# Patient Record
Sex: MALE | Race: WHITE | NOT HISPANIC OR LATINO | URBAN - METROPOLITAN AREA
[De-identification: names, ages, dates, MRNs, and addresses within clinical notes are randomized per-mention and may not be internally consistent; named-entity substitution may affect disease eponyms.]

---

## 2021-07-03 ENCOUNTER — EMERGENCY (EMERGENCY)
Facility: HOSPITAL | Age: 46
LOS: 1 days | Discharge: HOME | End: 2021-07-03
Attending: EMERGENCY MEDICINE | Admitting: EMERGENCY MEDICINE
Payer: COMMERCIAL

## 2021-07-03 VITALS
SYSTOLIC BLOOD PRESSURE: 134 MMHG | HEART RATE: 78 BPM | DIASTOLIC BLOOD PRESSURE: 83 MMHG | TEMPERATURE: 98 F | OXYGEN SATURATION: 98 % | HEIGHT: 74 IN | RESPIRATION RATE: 16 BRPM | WEIGHT: 244.05 LBS

## 2021-07-03 VITALS
DIASTOLIC BLOOD PRESSURE: 74 MMHG | OXYGEN SATURATION: 100 % | RESPIRATION RATE: 15 BRPM | TEMPERATURE: 98 F | SYSTOLIC BLOOD PRESSURE: 112 MMHG | HEART RATE: 64 BPM

## 2021-07-03 PROCEDURE — 70450 CT HEAD/BRAIN W/O DYE: CPT

## 2021-07-03 PROCEDURE — 99284 EMERGENCY DEPT VISIT MOD MDM: CPT

## 2021-07-03 PROCEDURE — 99284 EMERGENCY DEPT VISIT MOD MDM: CPT | Mod: 25

## 2021-07-03 PROCEDURE — G1004: CPT

## 2021-07-03 PROCEDURE — 70450 CT HEAD/BRAIN W/O DYE: CPT | Mod: 26,MG

## 2021-07-03 RX ORDER — ACETAMINOPHEN 500 MG
650 TABLET ORAL ONCE
Refills: 0 | Status: COMPLETED | OUTPATIENT
Start: 2021-07-03 | End: 2021-07-03

## 2021-07-03 RX ORDER — MUPIROCIN 20 MG/G
1 OINTMENT TOPICAL
Qty: 1 | Refills: 0
Start: 2021-07-03 | End: 2021-07-07

## 2021-07-03 RX ADMIN — Medication 650 MILLIGRAM(S): at 18:57

## 2021-07-03 RX ADMIN — Medication 650 MILLIGRAM(S): at 17:57

## 2021-07-03 NOTE — ED PROVIDER NOTE - ATTENDING CONTRIBUTION TO CARE
46yo male who has no sig pmhx was out at a restaurant yest when an object fell and struck him in the head he sustained 2 small lacerations, but continued to enjoy his meal. this am he awoke he had had and tonight while driving he felt unwell with head pain and malaise   no n v d no weakness numbness no drainage or bleeding from wounds  on eval  muscular male awake alert nad  heent pt has two small lacerations all ready  scabbing and closing there is tenderness about the cuts to the scalp  neck is supple no meningismus  cor rrr  chest cta  abd soft nt nd no hsm ext normal  neuro normal  skin as above  plan ct ro fx ffb hematoma or other path extra or intra-calveral after fall with head injry and concussive sx.

## 2021-07-03 NOTE — ED PROVIDER NOTE - CLINICAL SUMMARY MEDICAL DECISION MAKING FREE TEXT BOX
presents today due to head injury yesterday. pt reports he was outside a restaurant in which he hit his head into a possible light fixture or key box. Pt reports he was feeling well but woke up this morning with headache and nausea. pt reports puncture wound to scalp, reports he is uncertain of tetanus status but does not want to be updated. pt reports feeling dizzy. plan includes Ct head r/o CVA, re-assess

## 2021-07-03 NOTE — ED ADULT NURSE NOTE - CHPI ED NUR SYMPTOMS NEG
no blurred vision/no change in level of consciousness/no confusion/no loss of consciousness/no seizure/no syncope/no vomiting/no weakness

## 2021-07-03 NOTE — ED PROVIDER NOTE - PHYSICAL EXAMINATION
Constitutional: Awake, Alert, non-toxic. NAD. Well appearing, well nourished.   HEAD: Normocephalic, (+) puncture wound to parietal scalp   EYES: PERRL, EOM intact, conjunctiva and sclera are clear bilaterally. No raccoon eyes.   ENT: No graham sign. No rhinorrhea, normal pharynx, patent, no tonsillar exudate or enlargement, mucous membranes pink/moist, no erythema, no drooling or stridor.   NECK: Supple, non-tender  BACK: No midline or paraspinal TTP of cervical/thoracic/lumbar spine, FROM. No ecchymosis or hematomas.   CARDIOVASCULAR: Normal S1, S2; regular rate and rhythm.  RESPIRATORY: Normal respiratory effort; breath sounds CTAB, no wheezes, rhonchi, or rales. Speaking in full sentences. No accessory muscle use.   ABDOMEN: Soft; non-tender, non-distended.   EXTREMITIES: Full passive and active ROM in all extremities; non-tender to palpation; distal pulses palpable and symmetric, no edema, no crepitus or step off  SKIN: Warm, dry; good skin turgor, no apparent lesions or rashes, no ecchymosis, brisk capillary refill.  NEURO: A&O x3. Sensory and motor functions are grossly intact. Speech is normal. Appearance and judgement seem appropriate for gender and age. No neurological deficits. Neurovascular sensation intact motor function 5/5 of upper and lower extremities, CN II-XII grossly intact, no ataxia, absent pronator drift, intact cerebellar function. Speech clear, without articulation or word-finding difficulties. Eyes- PERRL bilaterally. EOMs in tact. No nystagmus. No facial droop.

## 2021-07-03 NOTE — ED ADULT NURSE NOTE - OBJECTIVE STATEMENT
45 YOM A&OX3 with no pertinent pmh presents to ED for head pain s/p head injury yesterday. pt states was out at a restaurant yesterday when his head hit a light fixture. pt did not lose consciousness. pt state today has head pain, dizziness, and nausea. pt rates pain 5/10. puncture wound from light fixture noted to scalp. pt unsure of tetanus status but declines tetanus injection today. pt denies sob, chest pain, vomiting/diarrhea, blurry vision. safety maintained.

## 2021-07-03 NOTE — ED PROVIDER NOTE - PATIENT PORTAL LINK FT
You can access the FollowMyHealth Patient Portal offered by [facility] by registering at the following website: [email or web address]. By joining [facility]’s FollowMyHealth portal, you will also be able to view your health information using other applications (apps) compatible with our system.

## 2021-07-03 NOTE — ED PROVIDER NOTE - PROGRESS NOTE DETAILS
Reevaluated patient at bedside. Patient notes improvement of symptoms. Discussed results with the patient and provided copies. All questions were answered. Discussed the importance of prompt, close medical follow-up. Patient will return with any changes, concerns or persistent/worsening symptoms.  Patient verbalized understanding.

## 2021-07-03 NOTE — ED PROVIDER NOTE - OBJECTIVE STATEMENT
44 y/o male without reported PMHx presents today due to head injury yesterday. pt reports he was outside a restaurant in which he hit his head into a possible light fixture or key box. Pt reports he was feeling well but woke up this morning with headache and nausea. pt reports puncture wound to scalp, reports he is uncertain of tetanus status but does not want to be updated. pt reports feeling dizzy. pt currently rating headache 5/10. pt denies numbness/weakness, vomiting, LOC, photophobia, cp, sob, or any other complaints.

## 2021-07-03 NOTE — ED PROVIDER NOTE - NSFOLLOWUPINSTRUCTIONS_ED_ALL_ED_FT
Follow up with [facility] concussion program. Take Tylenol for headache. Return for worsening headaches,     Concussion    WHAT YOU NEED TO KNOW:    What is a concussion? A concussion is a mild brain injury. It is usually caused by a bump or blow to the head from a fall, a motor vehicle crash, or a sports injury. Being shaken forcefully may also cause a concussion.    What are the signs and symptoms of a concussion? Symptoms may occur right away, or they may appear days after the concussion:  •A mild to moderate headache      •Dizziness, loss of balance, or blurry vision      •Nausea or vomiting      •A change in mood, such as restlessness or irritability      •Trouble thinking, remembering things, or concentrating      •Ringing in the ears      •Drowsiness or decreased energy      •Changes in your normal sleeping pattern      How is a concussion diagnosed? Your healthcare provider will ask how you were injured, and about your symptoms. He or she will also examine you. You may need any of the following:   •A neurologic exam is also called neuro signs, neuro checks, or neuro status. A neurologic exam can show healthcare providers how well your brain works after your injury. Healthcare providers will check how your pupils react to light. They may check your memory and how easily you wake up. Your hand grasp and balance may also be tested.      •CT or MRI pictures may be taken of your head. You may be given contrast liquid to help the pictures show up better. Tell the healthcare provider if you have ever had an allergic reaction to contrast liquid. Do not enter the MRI room with anything metal. Metal can cause serious injury. Tell the healthcare provider if you have any metal in or on your body.      How is a concussion managed? Usually no treatment is needed for a mild concussion. Concussion symptoms usually go away within about 10 days, but they may last longer. The following may be recommended to manage your symptoms:   •Rest from physical and mental activities as directed. Mental activities are those that require thinking, concentration, and attention. You will need to rest until your symptoms are gone. Rest will allow you to recover from your concussion. Ask your healthcare provider when you can return to work and other daily activities.      •Have someone stay with you for the first 24 hours after your injury. Your healthcare provider should be contacted if your symptoms get worse, or you develop new symptoms.      •Do not participate in sports and physical activities until your healthcare provider says it is okay. They could make your symptoms worse or lead to another concussion. Your healthcare provider will tell you when it is okay for you to return to sports or physical activities. Ask for more information about sports concussions.      •Acetaminophen decreases pain and fever. It is available without a doctor's order. Ask how much to take and how often to take it. Follow directions. Read the labels of all other medicines you are using to see if they also contain acetaminophen, or ask your doctor or pharmacist. Acetaminophen can cause liver damage if not taken correctly. Do not use more than 4 grams (4,000 milligrams) total of acetaminophen in one day.       •NSAIDs help decrease swelling and pain or fever. This medicine is available with or without a doctor's order. NSAIDs can cause stomach bleeding or kidney problems in certain people. If you take blood thinner medicine, always ask your healthcare provider if NSAIDs are safe for you. Always read the medicine label and follow directions.      How can I help prevent another concussion?   •Wear protective sports equipment that fits properly. Helmets help decrease your risk for a serious brain injury. Talk to your healthcare provider about ways you can decrease your risk for a concussion if you play sports.      •Wear your seatbelt every time you travel. This helps to decrease your risk for a head injury if you are in a car accident.       Have someone call 911 for any of the following:   •You cannot be woken.      •You have a seizure, increasing confusion, or a change in personality.      •Your speech becomes slurred.      When should I seek immediate care?   •You have sudden and new vision problems.      •You have a severe headache that does not go away.      •You have arm or leg weakness, numbness, or new problems with coordination.      •You have blood or clear fluid coming out of the ears or nose.      When should I contact my healthcare provider?   •You have nausea or are vomiting.      •You feel more sleepy than usual.      •Your symptoms get worse.      •Your symptoms last longer than 6 weeks after the injury.      •You have questions or concerns about your condition or care.      CARE AGREEMENT:    You have the right to help plan your care. Learn about your health condition and how it may be treated. Discuss treatment options with your healthcare providers to decide what care you want to receive. You always have the right to refuse treatment.

## 2024-04-03 NOTE — ED ADULT NURSE NOTE - PAIN RATING/NUMBER SCALE (0-10): ACTIVITY
Detail Level: Detailed
Quality 226: Preventive Care And Screening: Tobacco Use: Screening And Cessation Intervention: Patient screened for tobacco use and is an ex/non-smoker
7

## 2025-01-03 PROBLEM — Z78.9 OTHER SPECIFIED HEALTH STATUS: Chronic | Status: ACTIVE | Noted: 2021-07-03

## 2025-01-08 PROBLEM — Z00.00 ENCOUNTER FOR PREVENTIVE HEALTH EXAMINATION: Status: ACTIVE | Noted: 2025-01-08

## 2025-01-13 ENCOUNTER — APPOINTMENT (OUTPATIENT)
Dept: ORTHOPEDIC SURGERY | Facility: CLINIC | Age: 50
End: 2025-01-13

## 2025-01-14 ENCOUNTER — APPOINTMENT (OUTPATIENT)
Dept: ORTHOPEDIC SURGERY | Facility: CLINIC | Age: 50
End: 2025-01-14
Payer: OTHER MISCELLANEOUS

## 2025-01-14 VITALS
BODY MASS INDEX: 34.14 KG/M2 | DIASTOLIC BLOOD PRESSURE: 82 MMHG | HEIGHT: 74 IN | SYSTOLIC BLOOD PRESSURE: 143 MMHG | WEIGHT: 266 LBS | HEART RATE: 76 BPM

## 2025-01-14 DIAGNOSIS — M25.511 PAIN IN RIGHT SHOULDER: ICD-10-CM

## 2025-01-14 PROCEDURE — 99203 OFFICE O/P NEW LOW 30 MIN: CPT

## 2025-01-14 RX ORDER — DICLOFENAC SODIUM 75 MG/1
75 TABLET, DELAYED RELEASE ORAL
Qty: 60 | Refills: 2 | Status: ACTIVE | COMMUNITY
Start: 2025-01-14 | End: 1900-01-01

## 2025-02-01 ENCOUNTER — APPOINTMENT (OUTPATIENT)
Dept: MRI IMAGING | Facility: CLINIC | Age: 50
End: 2025-02-01

## 2025-02-11 ENCOUNTER — OUTPATIENT (OUTPATIENT)
Dept: OUTPATIENT SERVICES | Facility: HOSPITAL | Age: 50
LOS: 1 days | End: 2025-02-11
Payer: OTHER MISCELLANEOUS

## 2025-02-11 ENCOUNTER — APPOINTMENT (OUTPATIENT)
Dept: MRI IMAGING | Facility: CLINIC | Age: 50
End: 2025-02-11

## 2025-02-11 DIAGNOSIS — M25.511 PAIN IN RIGHT SHOULDER: ICD-10-CM

## 2025-02-11 PROCEDURE — 73221 MRI JOINT UPR EXTREM W/O DYE: CPT | Mod: 26,RT

## 2025-02-19 ENCOUNTER — APPOINTMENT (OUTPATIENT)
Dept: ORTHOPEDIC SURGERY | Facility: CLINIC | Age: 50
End: 2025-02-19
Payer: OTHER MISCELLANEOUS

## 2025-02-19 VITALS — HEIGHT: 74 IN | BODY MASS INDEX: 34.14 KG/M2 | WEIGHT: 266 LBS

## 2025-02-19 DIAGNOSIS — M25.511 PAIN IN RIGHT SHOULDER: ICD-10-CM

## 2025-02-19 PROCEDURE — 20610 DRAIN/INJ JOINT/BURSA W/O US: CPT | Mod: RT

## 2025-02-19 PROCEDURE — 99214 OFFICE O/P EST MOD 30 MIN: CPT | Mod: 25

## 2025-05-30 ENCOUNTER — APPOINTMENT (OUTPATIENT)
Dept: ORTHOPEDIC SURGERY | Facility: CLINIC | Age: 50
End: 2025-05-30
Payer: OTHER MISCELLANEOUS

## 2025-05-30 VITALS
SYSTOLIC BLOOD PRESSURE: 122 MMHG | HEART RATE: 85 BPM | BODY MASS INDEX: 33.37 KG/M2 | HEIGHT: 74 IN | WEIGHT: 260 LBS | DIASTOLIC BLOOD PRESSURE: 72 MMHG

## 2025-05-30 DIAGNOSIS — M54.50 LOW BACK PAIN, UNSPECIFIED: ICD-10-CM

## 2025-05-30 PROCEDURE — 99214 OFFICE O/P EST MOD 30 MIN: CPT | Mod: 25

## 2025-05-30 PROCEDURE — 20611 DRAIN/INJ JOINT/BURSA W/US: CPT | Mod: RT

## 2025-06-16 ENCOUNTER — APPOINTMENT (OUTPATIENT)
Dept: ORTHOPEDIC SURGERY | Facility: CLINIC | Age: 50
End: 2025-06-16
Payer: OTHER MISCELLANEOUS

## 2025-06-23 ENCOUNTER — APPOINTMENT (OUTPATIENT)
Dept: ORTHOPEDIC SURGERY | Facility: CLINIC | Age: 50
End: 2025-06-23
Payer: OTHER MISCELLANEOUS

## 2025-06-23 VITALS — HEIGHT: 74 IN | WEIGHT: 260 LBS | BODY MASS INDEX: 33.37 KG/M2

## 2025-06-23 PROBLEM — M47.816 LUMBAR SPONDYLOSIS: Status: ACTIVE | Noted: 2025-06-23

## 2025-06-23 PROBLEM — M54.16 LUMBAR RADICULOPATHY, ACUTE: Status: ACTIVE | Noted: 2025-06-23

## 2025-06-23 PROCEDURE — 72110 X-RAY EXAM L-2 SPINE 4/>VWS: CPT

## 2025-06-23 PROCEDURE — 99204 OFFICE O/P NEW MOD 45 MIN: CPT

## 2025-06-23 RX ORDER — GABAPENTIN 300 MG/1
300 CAPSULE ORAL
Qty: 30 | Refills: 3 | Status: ACTIVE | COMMUNITY
Start: 2025-06-23 | End: 1900-01-01

## 2025-08-11 ENCOUNTER — APPOINTMENT (OUTPATIENT)
Dept: ORTHOPEDIC SURGERY | Facility: CLINIC | Age: 50
End: 2025-08-11

## 2025-08-29 ENCOUNTER — APPOINTMENT (OUTPATIENT)
Dept: ORTHOPEDIC SURGERY | Facility: CLINIC | Age: 50
End: 2025-08-29
Payer: OTHER MISCELLANEOUS

## 2025-08-29 VITALS
DIASTOLIC BLOOD PRESSURE: 72 MMHG | HEIGHT: 74 IN | BODY MASS INDEX: 33.37 KG/M2 | SYSTOLIC BLOOD PRESSURE: 120 MMHG | WEIGHT: 260 LBS

## 2025-08-29 DIAGNOSIS — M25.511 PAIN IN RIGHT SHOULDER: ICD-10-CM

## 2025-08-29 PROCEDURE — 99214 OFFICE O/P EST MOD 30 MIN: CPT

## 2025-09-03 ENCOUNTER — NON-APPOINTMENT (OUTPATIENT)
Age: 50
End: 2025-09-03

## 2025-09-03 RX ORDER — METHYLPREDNISOLONE 4 MG/1
4 TABLET ORAL
Qty: 1 | Refills: 0 | Status: ACTIVE | COMMUNITY
Start: 2025-09-03 | End: 1900-01-01